# Patient Record
Sex: MALE | Race: WHITE | Employment: FULL TIME | URBAN - METROPOLITAN AREA
[De-identification: names, ages, dates, MRNs, and addresses within clinical notes are randomized per-mention and may not be internally consistent; named-entity substitution may affect disease eponyms.]

---

## 2017-02-03 ENCOUNTER — OFFICE VISIT (OUTPATIENT)
Dept: FAMILY MEDICINE | Facility: CLINIC | Age: 27
End: 2017-02-03
Payer: COMMERCIAL

## 2017-02-03 VITALS
HEIGHT: 65 IN | DIASTOLIC BLOOD PRESSURE: 65 MMHG | WEIGHT: 180.4 LBS | SYSTOLIC BLOOD PRESSURE: 105 MMHG | BODY MASS INDEX: 30.06 KG/M2 | TEMPERATURE: 97 F

## 2017-02-03 DIAGNOSIS — F40.243 FEAR OF FLYING: Primary | ICD-10-CM

## 2017-02-03 DIAGNOSIS — F81.9 PROBLEMS WITH LEARNING: ICD-10-CM

## 2017-02-03 DIAGNOSIS — F98.8 ADD (ATTENTION DEFICIT DISORDER): ICD-10-CM

## 2017-02-03 PROCEDURE — 99213 OFFICE O/P EST LOW 20 MIN: CPT | Performed by: FAMILY MEDICINE

## 2017-02-03 RX ORDER — LORAZEPAM 1 MG/1
0.5-1 TABLET ORAL EVERY 8 HOURS PRN
Qty: 6 TABLET | Refills: 0 | Status: SHIPPED | OUTPATIENT
Start: 2017-02-03 | End: 2019-10-16

## 2017-02-03 NOTE — PROGRESS NOTES
"  SUBJECTIVE:                                                    Colt Austin is a 26 year old male who presents to clinic today for the following health issues:      Pt presents for ativan today for trip on Sunday.  Will have panic if not using something. Has worked well in the past. Is going to [place] and not driving.  Going well otherwise.     ADD - not taking adderall anymore because is not in a situation where he needs to.     PMH: Updated and/or reviewed in chart.    PSH: Updated and/or reviewed in chart.    Family History: Updated and/or reviewed in chart.    SOCIAL HX:  Updated and/or reviewed in chart.          Problem list and histories reviewed & adjusted, as indicated.  Additional history: as documented    Problem list, Medication list, Allergies, and Medical/Social/Surgical histories reviewed in EPIC and updated as appropriate.    1. Fear of flying    2. Learing Disorders: Diagnosed with dyslexia and ADHD    3. ADD (attention deficit disorder)        PMH: Updated and/or reviewed in chart.    PSH: Updated and/or reviewed in chart.    Family History: Updated and/or reviewed in chart.     ROS:  Constitutional, HEENT, cardiovascular, pulmonary, gi and gu systems are otherwise negative.    OBJECTIVE:                                                    /65 mmHg  Temp(Src) 97  F (36.1  C) (Tympanic)  Ht 5' 5\" (1.651 m)  Wt 180 lb 6.4 oz (81.829 kg)  BMI 30.02 kg/m2  GENERAL: Pleasant and interactive.  Alert and oriented x 3.  No acute distress.  PSYCH: Alert and oriented times 3; coherent speech, normal rate and volume, able to articulate logical thoughts, able to abstract reason, no tangential thoughts, no hallucinations or delusions  His affect is normal.       Results for orders placed or performed in visit on 12/18/13   Drug screen, urine   Result Value Ref Range    Benzodiazepine Qual Urine  NEG     Negative   Cutoff for a negative benzodiazepine is 200 ng/mL or less.    Cannabinoids Qual " Urine  NEG     Negative   Cutoff for a negative cannabinoid is 50 ng/mL or less.    Cocaine Qual Urine  NEG     Negative   Cutoff for a negative cocaine is 300 ng/mL or less.   Testing performed using ToxSee method.    Opiates Qualitative Urine  NEG     Negative   Cutoff for a negative opiate is 300 ng/mL or less.    Acetaminophen Qual Negative NEG    Amantadine Qual Negative NEG    Amitriptyline Qual Negative NEG    Amoxapine Qual Negative NEG    Amphetamines Qual Negative NEG    Atropine Qual Negative NEG    Caffeine Qual Positive (A) NEG    Carbamazepine Qual Negative NEG    Chlorpheniramine Qual Negative NEG    Chlorpromazine Qual Negative NEG    Citalopram Qual Negative NEG    Clomipramine Qual Negative NEG    Cocaine Qual Negative NEG    Codeine Qual Negative NEG    Desipramine Qual Negative NEG    Dextromethorphan Qual Negative NEG    Diphenhydramine Qual Negative NEG    Doxepin/metabolite Qual Negative NEG    Doxylamine Qual Negative NEG    Ephedrine or pseudo Qual Negative NEG    Fentanyl Qual Negative NEG    Fluoxetine and metab Qual Negative NEG    Hydrocodone Qual Negative NEG    Hydromorphone Qual Negative NEG    Ibuprofen Qual Negative NEG    Imipramine Qual Negative NEG    Lamotrigine Qual Negative NEG    Loxapine Qual Negative NEG    Maprotiline Qual Negative NEG    MDMA Qual Negative NEG    Meperidine Qual Negative NEG    Methadone Qual Negative NEG    Methamphetamine Qual Negative NEG    Morphine Qual Negative NEG    Nicotine Qual Negative NEG    Nortriptyline Qual Negative NEG    Olanzapine Qual Negative NEG    Oxycodone Qual Negative NEG    Pentazocine Qual Negative NEG    Phencyclidine Qual Negative NEG    Phenmetrazine Qual Negative NEG    Phentermine Qual Negative NEG    Phenylbutazone Qual Negative NEG    Phenylpropanolamine Qual Negative NEG    Propoxyphene Qual Negative NEG    Propranolol Qual Negative NEG    Pyrilamine Qual Negative NEG    Salicylate Qual Negative NEG    Theobromine Qual  Positive (A) NEG    Trimipramine Qual Negative NEG    Topiramate Qual Negative NEG    Venlafaxine Qual Negative NEG      ASSESSMENT/PLAN:                                                        ICD-10-CM    1. Fear of flying F40.243 LORazepam (ATIVAN) 1 MG tablet   2. Learing Disorders: Diagnosed with dyslexia and ADHD F81.9    3. ADD (attention deficit disorder) F98.8        Care plan updated in chart for chronic problems.    Patient Instructions   Use the med as needed.  No alcohol with this.  Keep your mind busy when flying.          Orders Placed This Encounter     LORazepam (ATIVAN) 1 MG tablet      Goals     None           Roberto Carlos Zapata MD

## 2017-02-03 NOTE — NURSING NOTE
"Chief Complaint   Patient presents with     Medication Request       Initial /65 mmHg  Temp(Src) 97  F (36.1  C) (Tympanic)  Ht 5' 5\" (1.651 m)  Wt 180 lb 6.4 oz (81.829 kg)  BMI 30.02 kg/m2 Estimated body mass index is 30.02 kg/(m^2) as calculated from the following:    Height as of this encounter: 5' 5\" (1.651 m).    Weight as of this encounter: 180 lb 6.4 oz (81.829 kg).  BP completed using cuff size: regular    Clarisse Squire CMA      "

## 2017-02-03 NOTE — MR AVS SNAPSHOT
"              After Visit Summary   2/3/2017    Colt Austin    MRN: [id number]           Patient Information     Date Of Birth          1990        Visit Information        Provider Department      2/3/2017 1:30 PM Roberto Carlos Zapata MD [facility]        Today's Diagnoses     Fear of flying    -  1     Learing Disorders: Diagnosed with dyslexia and ADHD         ADD (attention deficit disorder)           Care Instructions    Use the med as needed.  No alcohol with this.  Keep your mind busy when flying.           Follow-ups after your visit        Who to contact     Normal or non-critical lab and imaging results will be communicated to you by [email or web address], letter or phone within 4 business days after the clinic has received the results. If you do not hear from us within 7 days, please contact the clinic through [employer] or phone. If you have a critical or abnormal lab result, we will notify you by phone as soon as possible.  Submit refill requests through .com or call your pharmacy and they will forward the refill request to us. Please allow 3 business days for your refill to be completed.          If you need to speak with a  for additional information , please call: [phone]           Additional Information About Your Visit        .com Information     .com lets you send messages to your doctor, view your test results, renew your prescriptions, schedule appointments and more. To sign up, go to [email or web address] . Click on \"Log in\" on the left side of the screen, which will take you to the Welcome page. Then click on \"Sign up Now\" on the right side of the page.     You will be asked to enter the access code listed below, as well as some personal information. Please follow the directions to create your username and password.     Your access code is: [email or web address]  Expires: 2017  1:46 PM     Your access code will  in 90 days. If you need help or a new " "code, please call your [facility] clinic or [phone].        Care EveryWhere ID     This is your Care EveryWhere ID. This could be used by other organizations to access your [facility] medical records  [id number]        Your Vitals Were     Temperature Height BMI (Body Mass Index)             97  F (36.1  C) (Tympanic) 5' 5\" (1.651 m) 30.02 kg/m2          Blood Pressure from Last 3 Encounters:   02/03/17 105/65   05/07/14 120/70   04/08/14 102/70    Weight from Last 3 Encounters:   02/03/17 180 lb 6.4 oz (81.829 kg)   05/07/14 175 lb (79.379 kg)   04/08/14 175 lb (79.379 kg)              Today, you had the following     No orders found for display         Today's Medication Changes          These changes are accurate as of: 2/3/17  1:46 PM.  If you have any questions, ask your nurse or doctor.               Start taking these medicines.        Dose/Directions    LORazepam 1 MG tablet   Commonly known as:  ATIVAN   Used for:  Fear of flying   Started by:  Roberto Carlos Zapata MD        Dose:  0.5-1 mg   Take 0.5-1 tablets (0.5-1 mg) by mouth every 8 hours as needed for anxiety Take 30 minutes prior to departure.  Do not operate a vehicle after taking this medication   Quantity:  6 tablet   Refills:  0            Where to get your medicines      Some of these will need a paper prescription and others can be bought over the counter.  Ask your nurse if you have questions.     Bring a paper prescription for each of these medications    - LORazepam 1 MG tablet             Primary Care Provider Office Phone # Fax #    Roberto Carlos Zapata [phone]       [facility] [address] [facility] [address]        Thank you!     Thank you for choosing [facility]  for your care. Our goal is always to provide you with excellent care. Hearing back from our patients is one way we can continue to improve our services. Please take a few minutes to complete the written survey that you " may receive in the mail after your visit with us. Thank you!             Your Updated Medication List - Protect others around you: Learn how to safely use, store and throw away your medicines at www.disposemymeds.org.          This list is accurate as of: 2/3/17  1:46 PM.  Always use your most recent med list.                   Brand Name Dispense Instructions for use    amphetamine-dextroamphetamine 20 MG per 24 hr capsule    ADDERALL XR    30 capsule    Take 1 capsule (20 mg) by mouth daily       LORazepam 1 MG tablet    ATIVAN    6 tablet    Take 0.5-1 tablets (0.5-1 mg) by mouth every 8 hours as needed for anxiety Take 30 minutes prior to departure.  Do not operate a vehicle after taking this medication

## 2019-10-16 ENCOUNTER — OFFICE VISIT (OUTPATIENT)
Dept: FAMILY MEDICINE | Facility: CLINIC | Age: 29
End: 2019-10-16
Payer: COMMERCIAL

## 2019-10-16 VITALS
BODY MASS INDEX: 31.21 KG/M2 | HEART RATE: 103 BPM | WEIGHT: 194.2 LBS | TEMPERATURE: 98 F | OXYGEN SATURATION: 98 % | DIASTOLIC BLOOD PRESSURE: 70 MMHG | HEIGHT: 66 IN | SYSTOLIC BLOOD PRESSURE: 124 MMHG

## 2019-10-16 DIAGNOSIS — Z00.00 ROUTINE PHYSICAL EXAMINATION: Primary | ICD-10-CM

## 2019-10-16 DIAGNOSIS — Z11.4 ENCOUNTER FOR SCREENING FOR HUMAN IMMUNODEFICIENCY VIRUS (HIV): ICD-10-CM

## 2019-10-16 DIAGNOSIS — Z13.220 LIPID SCREENING: ICD-10-CM

## 2019-10-16 DIAGNOSIS — L72.0 EPIDERMAL CYST: ICD-10-CM

## 2019-10-16 DIAGNOSIS — Z13.1 SCREENING FOR DIABETES MELLITUS: ICD-10-CM

## 2019-10-16 DIAGNOSIS — Z23 NEED FOR INFLUENZA VACCINATION: ICD-10-CM

## 2019-10-16 DIAGNOSIS — Z72.0 TOBACCO ABUSE: ICD-10-CM

## 2019-10-16 DIAGNOSIS — F40.243 FEAR OF FLYING: ICD-10-CM

## 2019-10-16 PROCEDURE — 99213 OFFICE O/P EST LOW 20 MIN: CPT | Mod: 25 | Performed by: FAMILY MEDICINE

## 2019-10-16 PROCEDURE — 99395 PREV VISIT EST AGE 18-39: CPT | Mod: 25 | Performed by: FAMILY MEDICINE

## 2019-10-16 PROCEDURE — 90686 IIV4 VACC NO PRSV 0.5 ML IM: CPT | Performed by: FAMILY MEDICINE

## 2019-10-16 PROCEDURE — 90471 IMMUNIZATION ADMIN: CPT | Performed by: FAMILY MEDICINE

## 2019-10-16 RX ORDER — LORAZEPAM 1 MG/1
TABLET ORAL
Qty: 6 TABLET | Refills: 0 | Status: SHIPPED | OUTPATIENT
Start: 2019-10-16 | End: 2020-01-03

## 2019-10-16 ASSESSMENT — ENCOUNTER SYMPTOMS
MYALGIAS: 0
APPETITE CHANGE: 0
BACK PAIN: 0
NAUSEA: 0
AGITATION: 0
ARTHRALGIAS: 0
EYE PAIN: 0
PHOTOPHOBIA: 0
COLOR CHANGE: 0
NERVOUS/ANXIOUS: 1
PALPITATIONS: 0
EYE DISCHARGE: 0
CHOKING: 0
NECK PAIN: 0
DYSURIA: 0
SHORTNESS OF BREATH: 0
FATIGUE: 0
ACTIVITY CHANGE: 0
CHILLS: 0
SEIZURES: 0
HEADACHES: 0
ABDOMINAL PAIN: 0
DIZZINESS: 0
SORE THROAT: 0
EYE REDNESS: 0
DIARRHEA: 0
CONSTIPATION: 0
CHEST TIGHTNESS: 0
CONFUSION: 0
COUGH: 0
ABDOMINAL DISTENTION: 0
FREQUENCY: 0
HEMATURIA: 0

## 2019-10-16 ASSESSMENT — MIFFLIN-ST. JEOR: SCORE: 1789.67

## 2019-10-16 NOTE — PROGRESS NOTES
SUBJECTIVE:   CC: Colt Austin is an 29 year old male who presents for preventative health visit.     Healthy Habits:     Getting at least 3 servings of Calcium per day:  Yes    Bi-annual eye exam:  NO    Dental care twice a year:  Yes    Sleep apnea or symptoms of sleep apnea:  None    Diet:  Regular (no restrictions)    Frequency of exercise:  4-5 days/week    Duration of exercise:  Other    Taking medications regularly:  Not Applicable    Barriers to taking medications:  Not applicable    Medication side effects:  Not applicable    PHQ-2 Total Score: 0    Additional concerns today:  Yes          *small lump right torso x6 months    Today's PHQ-2 Score:   PHQ-2 ( 1999 Pfizer) 10/16/2019   Q1: Little interest or pleasure in doing things 0   Q2: Feeling down, depressed or hopeless 0   PHQ-2 Score 0       Abuse: Current or Past(Physical, Sexual or Emotional)- No  Do you feel safe in your environment? Yes    Social History     Tobacco Use     Smoking status: Current Every Day Smoker     Packs/day: 0.50     Years: 5.00     Pack years: 2.50     Types: Cigarettes     Smokeless tobacco: Never Used   Substance Use Topics     Alcohol use: No     If you drink alcohol do you typically have >3 drinks per day or >7 drinks per week? No    No flowsheet data found.    Last PSA: No results found for: PSA    Reviewed orders with patient. Reviewed health maintenance and updated orders accordingly - Yes      Reviewed and updated as needed this visit by clinical staff  Tobacco  Allergies  Meds  Med Hx  Surg Hx  Fam Hx  Soc Hx        Reviewed and updated as needed this visit by Provider    Past Medical History:   Diagnosis Date     Attention deficit disorder with hyperactivity(314.01)      Tobacco abuse        Past Surgical History:   Procedure Laterality Date     HC EXCISION PILONIDAL LESION COMPLICATED  7/7/2008    (pilonidal fistulectomy)       Family History   Problem Relation Age of Onset     C.A.D. Paternal Grandfather       JOHNATHON. Father      Diabetes Paternal Grandmother        Social History     Tobacco Use     Smoking status: Current Every Day Smoker     Packs/day: 0.50     Years: 14.00     Pack years: 7.00     Types: Cigarettes     Smokeless tobacco: Never Used   Substance Use Topics     Alcohol use: No     Current Outpatient Medications   Medication     LORazepam (ATIVAN) 1 MG tablet     No current facility-administered medications for this visit.         Allergies   Allergen Reactions     Penicillins Rash       1. Establish care    2. Physical exam    3. Lump: On right torso, forearm, groin region.  Ongoing for the past 6 months.  No change in size.  No pain.  Conerned about sebaceous cyst.    4. Fear flying: Started at 8 yo.  Gets sweaty, panicking, and tearful at times.  Took ativan in the past with good relief.  Scheduled for [place] in December.     Review of Systems   Constitutional: Negative for activity change, appetite change, chills and fatigue.   HENT: Negative for congestion, ear pain, hearing loss and sore throat.    Eyes: Negative for photophobia, pain, discharge, redness and visual disturbance.   Respiratory: Negative for cough, choking, chest tightness and shortness of breath.    Cardiovascular: Negative for chest pain, palpitations and peripheral edema.   Gastrointestinal: Negative for abdominal distention, abdominal pain, constipation, diarrhea and nausea.   Endocrine: Negative for cold intolerance and heat intolerance.   Genitourinary: Negative for dysuria, frequency, hematuria and urgency.   Musculoskeletal: Negative for arthralgias, back pain, myalgias and neck pain.   Skin: Negative for color change and rash.   Neurological: Negative for dizziness, seizures and headaches.   Psychiatric/Behavioral: Negative for agitation, behavioral problems and confusion. The patient is nervous/anxious (associated wiht flying).          OBJECTIVE:   /70 (BP Location: Left arm, Cuff Size: Adult Large)   Pulse  "103   Temp 98  F (36.7  C) (Tympanic)   Ht 1.67 m (5' 5.75\")   Wt 88.1 kg (194 lb 3.2 oz)   SpO2 98%   BMI 31.58 kg/m      Physical Exam  Constitutional:       General: He is not in acute distress.  HENT:      Head: Normocephalic and atraumatic.      Right Ear: External ear normal.      Left Ear: External ear normal.      Nose: Nose normal.      Mouth/Throat:      Pharynx: No oropharyngeal exudate.   Eyes:      General:         Right eye: No discharge.         Left eye: No discharge.      Conjunctiva/sclera: Conjunctivae normal.      Pupils: Pupils are equal, round, and reactive to light.   Neck:      Musculoskeletal: Normal range of motion and neck supple.      Thyroid: No thyromegaly.      Trachea: No tracheal deviation.   Cardiovascular:      Rate and Rhythm: Normal rate and regular rhythm.      Heart sounds: Normal heart sounds. No murmur.   Pulmonary:      Effort: No respiratory distress.      Breath sounds: Normal breath sounds. No wheezing.   Chest:      Chest wall: No tenderness.   Abdominal:      General: There is no distension.      Palpations: Abdomen is soft. There is no mass.      Tenderness: There is no tenderness. There is no guarding.   Musculoskeletal: Normal range of motion.   Lymphadenopathy:      Cervical: No cervical adenopathy.   Skin:     General: Skin is warm.      Findings: No erythema or rash.      Comments: Approximately 1 cm epidermal cyst involving right flank region and right forearm.  Small 0.5 cm epidermal cyst involving medial inguinal region.   Neurological:      Mental Status: He is alert and oriented to person, place, and time.      Cranial Nerves: No cranial nerve deficit.      Coordination: Coordination normal.         ASSESSMENT/PLAN:   1. Routine physical examination    2. Fear of flying  - LORazepam (ATIVAN) 1 MG tablet; Take 1 tablet 30 minutes prior to departure (Max of 1 per day).  Do not operate a vehicle after taking this medication  Dispense: 6 tablet; Refill: " "0    3. Lipid screening  - Lipid panel reflex to direct LDL Fasting; Future    4. Screening for diabetes mellitus  - Glucose; Future    5. Need for influenza vaccination  - INFLUENZA VACCINE IM > 6 MONTHS VALENT IIV4 [68396]  -      ADMIN VACCINE, FIRST [37705]    6. Encounter for screening for human immunodeficiency virus (HIV)  - HIV Antigen Antibody Combo; Future    7. Epidermal cyst  Involving right flank region, forearm, and inguinal region.  Supportive care for now.  Advised to return to clinic if change in size or pain.     8. Tobacco abuse  Stressed the importance of tobacco cessation.     Estimated body mass index is 31.58 kg/m  as calculated from the following:    Height as of this encounter: 1.67 m (5' 5.75\").    Weight as of this encounter: 88.1 kg (194 lb 3.2 oz).      reports that he has been smoking cigarettes. He has a 2.50 pack-year smoking history. He has never used smokeless tobacco.  Tobacco Cessation Action Plan: Information offered: Patient not interested at this time    Counseling Resources:  ATP IV Guidelines  Pooled Cohorts Equation Calculator  FRAX Risk Assessment  ICSI Preventive Guidelines  Dietary Guidelines for Americans, 2010  USDA's MyPlate  ASA Prophylaxis  Lung CA Screening    Conrado Padilla, DO  [facility]  "

## 2019-10-16 NOTE — PATIENT INSTRUCTIONS
Marco A Austin,    Thank you for allowing [facility] to manage your care.    I ordered some fasting blood work, please go to the laboratory to get your laboratory studies.  Please call [phone] to scheduled your laboratory appointment.     I sent your prescriptions to your pharmacy.    Please allow 1-2 business days for our office to call you in regards to your laboratory/radiological studies.  If not done so, I encourage you to login into [employer] ([email or web address] to review your results as well.     If you have any questions or concerns, please feel free to call us at [phone].    Sincerely,    Dr. Padilla    Did you know?  You can schedule an e-Visit for certain simple non-emergent issue for your convenience.  To learn more about or start an eVisit, simply login to [employer], click  Visits  on top banner, click  Start a Virtual Visit  drop down, and click  Symptom-Specific E-Visit

## 2019-10-16 NOTE — NURSING NOTE
"Initial /70 (BP Location: Left arm, Cuff Size: Adult Large)   Pulse 103   Temp 98  F (36.7  C) (Tympanic)   Ht 1.67 m (5' 5.75\")   Wt 88.1 kg (194 lb 3.2 oz)   SpO2 98%   BMI 31.58 kg/m   Estimated body mass index is 31.58 kg/m  as calculated from the following:    Height as of this encounter: 1.67 m (5' 5.75\").    Weight as of this encounter: 88.1 kg (194 lb 3.2 oz). .    "

## 2020-01-03 DIAGNOSIS — F40.243 FEAR OF FLYING: ICD-10-CM

## 2020-01-03 RX ORDER — LORAZEPAM 1 MG/1
TABLET ORAL
Qty: 6 TABLET | Refills: 0 | Status: SHIPPED | OUTPATIENT
Start: 2020-01-03 | End: 2020-09-10

## 2020-01-03 NOTE — TELEPHONE ENCOUNTER
Rx sent to pharmacy.    1. Fear of flying  - LORazepam (ATIVAN) 1 MG tablet; Take 1 tablet 30 minutes prior to departure (Max of 1 per day).  Do not operate a vehicle after taking this medication  Dispense: 6 tablet; Refill: 0

## 2020-01-03 NOTE — TELEPHONE ENCOUNTER
Patient will be flying next weekend and would like a refill on his ativan please.  Pharmacy selected.    Anju Nicholas, [facility]

## 2020-07-30 ENCOUNTER — OFFICE VISIT (OUTPATIENT)
Dept: FAMILY MEDICINE | Facility: CLINIC | Age: 30
End: 2020-07-30

## 2020-07-30 VITALS
OXYGEN SATURATION: 99 % | HEART RATE: 96 BPM | SYSTOLIC BLOOD PRESSURE: 131 MMHG | TEMPERATURE: 98.1 F | RESPIRATION RATE: 15 BRPM | WEIGHT: 192 LBS | BODY MASS INDEX: 31.23 KG/M2 | DIASTOLIC BLOOD PRESSURE: 79 MMHG

## 2020-07-30 DIAGNOSIS — R07.9 CHEST PAIN, UNSPECIFIED TYPE: Primary | ICD-10-CM

## 2020-07-30 DIAGNOSIS — H61.22 IMPACTED CERUMEN OF LEFT EAR: ICD-10-CM

## 2020-07-30 DIAGNOSIS — K21.00 GASTROESOPHAGEAL REFLUX DISEASE WITH ESOPHAGITIS: ICD-10-CM

## 2020-07-30 PROCEDURE — 69210 REMOVE IMPACTED EAR WAX UNI: CPT | Mod: LT | Performed by: NURSE PRACTITIONER

## 2020-07-30 PROCEDURE — 99213 OFFICE O/P EST LOW 20 MIN: CPT | Mod: 25 | Performed by: NURSE PRACTITIONER

## 2020-07-30 PROCEDURE — 93000 ELECTROCARDIOGRAM COMPLETE: CPT | Performed by: NURSE PRACTITIONER

## 2020-07-30 ASSESSMENT — ENCOUNTER SYMPTOMS
FREQUENCY: 0
DYSPHORIC MOOD: 0
CONSTIPATION: 0
JOINT SWELLING: 0
HEADACHES: 0
COUGH: 0
NUMBNESS: 0
DYSURIA: 0
SHORTNESS OF BREATH: 0
LIGHT-HEADEDNESS: 0
ABDOMINAL PAIN: 0
SORE THROAT: 0
PALPITATIONS: 0
RHINORRHEA: 0
WHEEZING: 0
FATIGUE: 0
NERVOUS/ANXIOUS: 1
DIARRHEA: 0
DIZZINESS: 0
ARTHRALGIAS: 0
SLEEP DISTURBANCE: 0
SINUS PRESSURE: 0

## 2020-07-30 NOTE — NURSING NOTE
Patient identified using two patient identifiers.  Ear exam showing wax occlusion completed by provider.  Solution: warm water was placed in the left ear via irrigation tool: evangelist reynolds.    Lisa Tamayo CMA

## 2020-07-30 NOTE — PROGRESS NOTES
Subjective     Colt Austin is a 30 year old male who presents to clinic today for the following health issues:    HPI   CHEST PAIN     Onset: 3 weeks; intermittent throughout day    Description:   Location:  right side  Character: tight, sharp  Radiation: None  Duration: intermittent     Intensity: moderate    Progression of Symptoms:  intermittent    Accompanying Signs & Symptoms:  Shortness of breath: no  Sweating: no  Nausea/vomiting: no  Lightheadedness: no  Palpitations: no  Fever/Chills: no  Cough: no  Heartburn: YES    History:   Family history of heart disease no  Tobacco use: YES-     Precipitating factors:   Worse with exertion: no  Worse with deep breaths :  no  Related to food: no; hx of heartburn- feels different    Alleviating factors:  None       Therapies Tried and outcome: None    *Increased stress as he is laid off currently due to COVID and feels anxiety is higher..    Lisa Tamayo CMA    Current Outpatient Medications   Medication Sig Dispense Refill     LORazepam (ATIVAN) 1 MG tablet Take 1 tablet 30 minutes prior to departure (Max of 1 per day).  Do not operate a vehicle after taking this medication (Patient not taking: Reported on 7/30/2020) 6 tablet 0     Allergies   Allergen Reactions     Penicillins Rash       Reviewed and updated as needed this visit by Provider         Has been laid off due to COVID, had been smoking more. Anxiety is worse.     Has been having chest pain off and on for the last 3 weeks. Chest pain will be there for a few minutes and then will be gone. Is only there for a couple of minutes. No nausea. Has felt like heart skipped a beat, but did not have chest pain at that time.  Feels like it is unrelated.  Will have that feeling very rarely.  Thinks has had it for times since was age 19.  No sweating with the chest pain. The pain is a stabbing pain on the right side. No pain to left side if chest. Has heartburn nightly when lays down to go to sleep. When is up during  the day does not have it. Takes some Tums and that makes it better. No wheezing. Drinks a lot of coffee-2 pots per day. Thinks drinks about 2 energy drinks per week. Does not drink alcohol at all. Takes NSAIDS rarely. Unsure if is having any stool changes. Brother had cardiac stent when he was 35 or 26. He is obese and has DM.       Review of Systems   Constitutional: Negative for fatigue.   HENT: Negative for congestion, ear pain, rhinorrhea, sinus pressure and sore throat.    Respiratory: Negative for cough, shortness of breath and wheezing.    Cardiovascular: Positive for chest pain. Negative for palpitations and leg swelling.   Gastrointestinal: Negative for abdominal pain, constipation and diarrhea.   Genitourinary: Negative for dysuria and frequency.   Musculoskeletal: Negative for arthralgias and joint swelling.   Skin: Negative for rash.   Neurological: Negative for dizziness, light-headedness, numbness and headaches.   Psychiatric/Behavioral: Negative for dysphoric mood and sleep disturbance. The patient is nervous/anxious.          Objective    /79   Pulse 96   Temp 98.1  F (36.7  C) (Tympanic)   Resp 15   Wt 87.1 kg (192 lb)   SpO2 99%   BMI 31.23 kg/m    Body mass index is 31.23 kg/m .    Physical Exam  Constitutional:       Appearance: He is well-developed.   HENT:      Right Ear: Tympanic membrane and external ear normal.      Left Ear: External ear normal. A middle ear effusion is present.      Ears:      Comments: Unable to visualize tympanic membrane due to obscurity by cerumen.  Impacted cerumen removal completed to left ear per irrigation.  Procedure completed without difficulty.           Mouth/Throat:      Pharynx: Uvula midline.   Neck:      Thyroid: No thyromegaly.      Vascular: No carotid bruit.   Cardiovascular:      Rate and Rhythm: Normal rate and regular rhythm.      Heart sounds: Normal heart sounds.   Pulmonary:      Effort: Pulmonary effort is normal.      Breath sounds:  Normal breath sounds.   Abdominal:      General: Bowel sounds are normal.      Palpations: Abdomen is soft.   Skin:     General: Skin is warm and dry.   Neurological:      Mental Status: He is alert.             Assessment & Plan     1. Chest pain, unspecified type  Unremarkable   - EKG 12-lead complete w/read - Clinics    2. Impacted cerumen of left ear  Treatment plan reviewed and understood by patient.  Reviewed importance of not using Q-tips in ear canal.  Instructed to call or return for:   - Ear pain or drainage   - Decrease hearing   - Symptoms worsen or fail to resolve in 3 days  - REMOVE IMPACTED CERUMEN    3. Gastroesophageal reflux disease with esophagitis  Discussed how to diminish symptoms. Patient understands that it may take 1-2 weeks to experience an improvement in symptoms  --Enc to stop smoking congratulated on recognition of need to quit  --Stop alcohol or at least drink no more than one drink per day  --Avoid eating large meals, do not eat late at night   --Avoid foods that trigger   --Elevate head of bed 2-3 inches  --Eat frequently  Encouraged to start taking over-the-counter Pepcid or Tagamet daily.  Encouraged to decrease coffee intake.      Return in about 2 weeks (around 8/13/2020).    ELIZABETH Devi [facility] CATHERINE

## 2020-09-10 DIAGNOSIS — F40.243 FEAR OF FLYING: ICD-10-CM

## 2020-09-10 RX ORDER — LORAZEPAM 1 MG/1
TABLET ORAL
Qty: 6 TABLET | Refills: 0 | Status: SHIPPED | OUTPATIENT
Start: 2020-09-10 | End: 2021-06-01

## 2020-09-10 NOTE — TELEPHONE ENCOUNTER
Patient is requesting a refill on his ativan, because he has to go on a trip and has a fear of flying.    Anju Nicholas, [facility]

## 2020-11-09 NOTE — PATIENT INSTRUCTIONS
Start taking over the counter pepcid or tagamet daily to prevent heartburn. You can still take tums as needed.     Patient Education     GERD (Adult)    The esophagus is a tube that carries food from the mouth to the stomach. A valve (the LES, lower esophageal sphincter) at the lower end of the esophagus prevents stomach acid from flowing upward. When this valve doesn't work properly, stomach contents may repeatedly flow back up (reflux) into the esophagus. This is called gastroesophageal reflux disease (GERD). GERD can irritate the esophagus. It can cause problems with pain, swallowing or breathing. In severe cases, GERD can cause recurrent pneumonia (from aspiration or breathing in particles) or other serious problems.  Symptoms of reflux include burning, pressure or sharp pain in the upper abdomen or mid to lower chest. The pain can spread to the neck, back, or shoulder. There may be belching, an acid taste in the back of the throat, chronic cough, or sore throat, or hoarseness. GERD symptoms often occur during the day after a big meal. They can also occur at night when lying down.   Home care  Lifestyle changes can help reduce symptoms. If needed, your healthcare provider may prescribe medicines. Symptoms often improve with treatment, but if treatment is stopped, the symptoms often return after a few months. So most persons with GERD will need to continue treatment or get treatment on and off.  Lifestyle changes    Limit or avoid fatty, fried, and spicy foods, as well as coffee, chocolate, mint, and foods with high acid content such as tomatoes and citrus fruit and juices (orange, grapefruit, lemon).    Don t eat large meals, especially at night. Frequent, smaller meals are best. Don't lie down right after eating. And don t eat anything 3 hours before going to bed.    Don't drink alcohol or smoke. As much as possible, stay away from second hand smoke.    If you are overweight, losing weight will reduce  "symptoms.     Don't wear tight clothing around your stomach area.    If your symptoms occur during sleep, use a foam wedge to elevate your upper body (not just your head.) Or, place 4\" blocks under the head of your bed. Or use 2 bed risers under your bedframe.  Medicines  If needed, medicines can help relieve the symptoms of GERD and prevent damage to the esophagus. Discuss a medicine plan with your healthcare provider. This may include one or more of the following medicines:    Antacids to help neutralize the normal acids in your stomach.    Acid blockers (Histamine or H2 blockers) to decrease acid production.    Acid inhibitors (proton pump inhibitors PPIs) to decrease acid production in a different way than the blockers. They may work better, but can take a little longer to take effect.  Take an antacid 30 to 60 minutes after eating and at bedtime, but not at the same time as an acid blocker.  Try not to take medicines such as ibuprofen and aspirin. If you are taking aspirin for your heart or other medical reasons, talk to your healthcare provider about stopping it.  Follow-up care  Follow up with your healthcare provider or as advised by our staff.  When to seek medical advice  Call your healthcare provider if any of the following occur:    Stomach pain gets worse or moves to the lower right abdomen (appendix area)    Chest pain appears or gets worse, or spreads to the back, neck, shoulder, or arm    An over-the-counter trial of medicine doesn't relieve your symptoms    Weight loss that can't be explained    Trouble or pain swallowing    Frequent vomiting (can t keep down liquids)    Blood in the stool or vomit (red or black in color)    Feeling weak or dizzy    Fever of 100.4 F (38 C) or higher, or as directed by your healthcare provider  Date Last Reviewed: 3/1/2018    8184-8594 [employer]. [address] [place] [address], [place] [address]. All rights reserved. This information is not intended as a " substitute for professional medical care. Always follow your healthcare professional's instructions.            Nutrition-focused physical exam not warranted at this time

## 2021-05-25 ENCOUNTER — RECORDS - HEALTHEAST (OUTPATIENT)
Dept: ADMINISTRATIVE | Facility: CLINIC | Age: 31
End: 2021-05-25

## 2021-05-31 DIAGNOSIS — F40.243 FEAR OF FLYING: ICD-10-CM

## 2021-05-31 NOTE — TELEPHONE ENCOUNTER
..Reason for Call:  Medication or medication refill:    Do you use a [facility]?  Name of the pharmacy and phone number for the current request:  [facility] [phone]    Name of the medication requested:   LORazepam (ATIVAN) 1 MG tablet    Other request: travelling on June 4th; hoping to get this before then;   last seen 2019/Valerie    Can we leave a detailed message on this number? YES    Phone number patient can be reached at: Home number on file [phone] (home)    Best Time: anytime    Call taken on 5/31/2021 at 3:12 PM by Anny Richmond        Travelling 06-04

## 2021-06-01 RX ORDER — LORAZEPAM 1 MG/1
TABLET ORAL
Qty: 6 TABLET | Refills: 0 | Status: SHIPPED | OUTPATIENT
Start: 2021-06-01

## 2021-06-01 NOTE — TELEPHONE ENCOUNTER
Routing refill request to provider for review/approval because:  Drug not on the [facility] refill protocol     Last Written Prescription Date:  9-10-20  Last Fill Quantity: 6,  # refills: 0   Last office visit: 7/30/2020 with prescribing provider:  Toma Prieto   Future Office Visit:

## 2021-10-31 ENCOUNTER — HEALTH MAINTENANCE LETTER (OUTPATIENT)
Age: 31
End: 2021-10-31

## 2021-12-26 NOTE — TELEPHONE ENCOUNTER
Reason for call:  Medication   If this is a refill request, has the caller requested the refill from the pharmacy already? N/A  Will the patient be using a [facility]? N/A  Name of the pharmacy and phone number for the current request: [facility] [id number], [facility], [address]     Name of the medication requested:ATIVAN 1 MG Tablet     Other request: please refill pts meds asap, pt is out of med     Phone number to reach patient:  Cell number on file:    Telephone Information:   Mobile [phone]       Best Time:  anytime    Can we leave a detailed message on this number?  YES    Travel screening: Not Applicable

## 2021-12-27 NOTE — TELEPHONE ENCOUNTER
Refill denied.  Patient has not been seen in over a year, asking for a controlled medication, and asking [place] out of [place].

## 2022-10-23 ENCOUNTER — HEALTH MAINTENANCE LETTER (OUTPATIENT)
Age: 32
End: 2022-10-23

## 2022-12-10 ENCOUNTER — HEALTH MAINTENANCE LETTER (OUTPATIENT)
Age: 32
End: 2022-12-10

## 2024-01-14 ENCOUNTER — HEALTH MAINTENANCE LETTER (OUTPATIENT)
Age: 34
End: 2024-01-14